# Patient Record
Sex: FEMALE | URBAN - METROPOLITAN AREA
[De-identification: names, ages, dates, MRNs, and addresses within clinical notes are randomized per-mention and may not be internally consistent; named-entity substitution may affect disease eponyms.]

---

## 2022-03-22 ENCOUNTER — APPOINTMENT (RX ONLY)
Dept: URBAN - METROPOLITAN AREA CLINIC 111 | Facility: CLINIC | Age: 22
Setting detail: DERMATOLOGY
End: 2022-03-22

## 2022-03-22 DIAGNOSIS — L73.9 FOLLICULAR DISORDER, UNSPECIFIED: ICD-10-CM | Status: INADEQUATELY CONTROLLED

## 2022-03-22 DIAGNOSIS — I78.1 NEVUS, NON-NEOPLASTIC: ICD-10-CM

## 2022-03-22 DIAGNOSIS — Z41.9 ENCOUNTER FOR PROCEDURE FOR PURPOSES OTHER THAN REMEDYING HEALTH STATE, UNSPECIFIED: ICD-10-CM

## 2022-03-22 DIAGNOSIS — L71.0 PERIORAL DERMATITIS: ICD-10-CM

## 2022-03-22 DIAGNOSIS — L663 OTHER SPECIFIED DISEASES OF HAIR AND HAIR FOLLICLES: ICD-10-CM | Status: INADEQUATELY CONTROLLED

## 2022-03-22 DIAGNOSIS — L738 OTHER SPECIFIED DISEASES OF HAIR AND HAIR FOLLICLES: ICD-10-CM | Status: INADEQUATELY CONTROLLED

## 2022-03-22 PROBLEM — L02.32 FURUNCLE OF BUTTOCK: Status: ACTIVE | Noted: 2022-03-22

## 2022-03-22 PROCEDURE — ? DIAGNOSIS COMMENT

## 2022-03-22 PROCEDURE — ? PRESCRIPTION MEDICATION MANAGEMENT

## 2022-03-22 PROCEDURE — ? ADDITIONAL NOTES

## 2022-03-22 PROCEDURE — 99204 OFFICE O/P NEW MOD 45 MIN: CPT

## 2022-03-22 PROCEDURE — ? PRESCRIPTION

## 2022-03-22 PROCEDURE — ? DIOLITE 532 LASER

## 2022-03-22 PROCEDURE — ? COUNSELING

## 2022-03-22 RX ORDER — SODIUM SULFACETAMIDE, SULFUR 50; 100 MG/G; MG/G
LOTION TOPICAL
Qty: 170 | Refills: 2 | Status: ERX | COMMUNITY
Start: 2022-03-22

## 2022-03-22 RX ORDER — TACROLIMUS 1 MG/G
OINTMENT TOPICAL
Qty: 30 | Refills: 1 | Status: ERX | COMMUNITY
Start: 2022-03-22

## 2022-03-22 RX ORDER — MINOCYCLINE 40 MG/G
AEROSOL, FOAM TOPICAL
Qty: 30 | Refills: 2 | Status: ERX | COMMUNITY
Start: 2022-03-22

## 2022-03-22 RX ORDER — METRONIDAZOLE 10 MG/G
GEL TOPICAL
Qty: 60 | Refills: 2 | Status: ERX | COMMUNITY
Start: 2022-03-22

## 2022-03-22 RX ADMIN — MINOCYCLINE: 40 AEROSOL, FOAM TOPICAL at 00:00

## 2022-03-22 RX ADMIN — SODIUM SULFACETAMIDE, SULFUR: 50; 100 LOTION TOPICAL at 00:00

## 2022-03-22 RX ADMIN — METRONIDAZOLE: 10 GEL TOPICAL at 00:00

## 2022-03-22 RX ADMIN — TACROLIMUS: 1 OINTMENT TOPICAL at 00:00

## 2022-03-22 ASSESSMENT — LOCATION ZONE DERM
LOCATION ZONE: FACE
LOCATION ZONE: EYELID
LOCATION ZONE: TRUNK
LOCATION ZONE: FACE

## 2022-03-22 ASSESSMENT — LOCATION SIMPLE DESCRIPTION DERM
LOCATION SIMPLE: LEFT INFERIOR EYELID
LOCATION SIMPLE: LEFT BUTTOCK
LOCATION SIMPLE: RIGHT CHEEK
LOCATION SIMPLE: RIGHT CHEEK
LOCATION SIMPLE: RIGHT INFERIOR EYELID
LOCATION SIMPLE: RIGHT BUTTOCK

## 2022-03-22 ASSESSMENT — LOCATION DETAILED DESCRIPTION DERM
LOCATION DETAILED: RIGHT BUTTOCK
LOCATION DETAILED: RIGHT LATERAL INFERIOR EYELID
LOCATION DETAILED: RIGHT SUPERIOR CENTRAL MALAR CHEEK
LOCATION DETAILED: RIGHT SUPERIOR CENTRAL MALAR CHEEK
LOCATION DETAILED: LEFT LATERAL INFERIOR EYELID
LOCATION DETAILED: LEFT BUTTOCK

## 2022-03-22 NOTE — PROCEDURE: ADDITIONAL NOTES
Additional Notes: Discussed cosmetic treatment including Diolite laser if patient desires in the future (pricing and information given to patient).
Detail Level: Simple
Render Risk Assessment In Note?: no

## 2022-03-22 NOTE — PROCEDURE: DIAGNOSIS COMMENT
Comment: Patient voiced she did not want to do laser but her mother insisted and talked her into doing it. I advised that procedure is cosmetic and not necessary.
Render Risk Assessment In Note?: no
Detail Level: Detailed

## 2022-03-22 NOTE — HPI: RASH
What Type Of Note Output Would You Prefer (Optional)?: Bullet Format
How Severe Is Your Rash?: moderate
Is This A New Presentation, Or A Follow-Up?: Rash
Additional History: Patient states alcohol tends to flare the irritation

## 2022-03-22 NOTE — PROCEDURE: PRESCRIPTION MEDICATION MANAGEMENT
Initiate Treatment: -\\n- SulfaCleanse: use as face wash once daily \\n- Metronidazole 1% cream: apply thin layer to entire face once daily \\n- Tacrolimus 0.1% ointment: apply to affected areas once-twice daily as needed for flares
Detail Level: Zone
Render In Strict Bullet Format?: Yes
Plan: Follow up in 2 months
Initiate Treatment: -\\n- Benzoyl peroxide wash once daily\\n- Amzeeq 4% foam: apply to affected areas once daily after washing with benzoyl peroxide

## 2022-03-22 NOTE — PROCEDURE: DIOLITE 532 LASER
Repetition Rate (Hz): 3
Total Pulses (Optional): 88
Fluence In J/Cm2 (Optional): 15.5
Post-Care Instructions: Diolite laser \\n What to Expect\\n    A. Redness or bruising in treated areas, with some vascular lesions still visible. \\n    B. Itching or mild discomfort, occasionally with slight blistering, followed by scabbing.\\n    C. Vessels or lesions may be discolored after treatment, and that it may last for several weeks during the healing process. \\n\\nCare of Treated Area(s)\\n    D. Wash as usual, with a gentle cleanser, but do not scrub. \\n    E. Apply CeraVe Healing Ointment or Vaseline to minimize crusting. \\n    F. If a blister opens or drains, you may apply Polysporin antibiotic ointment and a bandage, if needed. \\n    G. Use sunscreen consistently to minimize burning, irritation, and pigment changes in treated areas.\\nActivity to Avoid\\n    H. Do no pick/pull off scab. Allow to completely heal. \\n    I. Avoid excess sun exposure.\\nReturn to Clinic\\n    J. For any signs or symptoms of infection. \\n    K. If lesion is persistent 3 to 4 weeks after treatment, may return to clinic for additional treatment. Contact the office for a quote for the cost of further treatments.
Price (Use Numbers Only, No Special Characters Or $): 75
Post-Procedure Care: advised to apply CeraVe Healing Ointment or Vaseline. Post care reviewed with patient.
Consent: Prior to the procedure consent was obtained, risks reviewed including but not limited to crusting, scabbing, blistering, scarring, darker or lighter pigmentary change, incidental hair removal, bruising, and/or incomplete removal.
Indication: vascular lesion
Handpiece: 1000 microns
Detail Level: Detailed
Comment: *** 1400 hand piece used ***

## 2022-03-24 ENCOUNTER — RX ONLY (OUTPATIENT)
Age: 22
Setting detail: RX ONLY
End: 2022-03-24

## 2022-03-24 RX ORDER — SODIUM SULFACETAMIDE, SULFUR 50; 100 MG/G; MG/G
LOTION TOPICAL
Qty: 170 | Refills: 2 | Status: ERX

## 2022-05-17 ENCOUNTER — RX ONLY (OUTPATIENT)
Age: 22
Setting detail: RX ONLY
End: 2022-05-17